# Patient Record
Sex: MALE | Race: OTHER | NOT HISPANIC OR LATINO | URBAN - METROPOLITAN AREA
[De-identification: names, ages, dates, MRNs, and addresses within clinical notes are randomized per-mention and may not be internally consistent; named-entity substitution may affect disease eponyms.]

---

## 2019-09-08 ENCOUNTER — EMERGENCY (EMERGENCY)
Facility: HOSPITAL | Age: 24
LOS: 1 days | Discharge: HOME | End: 2019-09-08
Attending: EMERGENCY MEDICINE
Payer: COMMERCIAL

## 2019-09-08 VITALS
RESPIRATION RATE: 16 BRPM | TEMPERATURE: 99 F | DIASTOLIC BLOOD PRESSURE: 80 MMHG | SYSTOLIC BLOOD PRESSURE: 113 MMHG | HEART RATE: 56 BPM | OXYGEN SATURATION: 100 %

## 2019-09-08 VITALS — WEIGHT: 125 LBS | HEIGHT: 68 IN

## 2019-09-08 LAB
AMPHET UR-MCNC: NEGATIVE — SIGNIFICANT CHANGE UP
BARBITURATES UR SCN-MCNC: NEGATIVE — SIGNIFICANT CHANGE UP
BENZODIAZ UR-MCNC: NEGATIVE — SIGNIFICANT CHANGE UP
COCAINE METAB.OTHER UR-MCNC: NEGATIVE — SIGNIFICANT CHANGE UP
ETHANOL SERPL-MCNC: <10 MG/DL — SIGNIFICANT CHANGE UP
HCT VFR BLD CALC: 51.7 % — HIGH (ref 39–50)
HGB BLD-MCNC: 16.2 G/DL — SIGNIFICANT CHANGE UP (ref 13–17)
MCHC RBC-ENTMCNC: 26.8 PG — LOW (ref 27–34)
MCHC RBC-ENTMCNC: 31.3 GM/DL — LOW (ref 32–36)
MCV RBC AUTO: 85.6 FL — SIGNIFICANT CHANGE UP (ref 80–100)
METHADONE UR-MCNC: NEGATIVE — SIGNIFICANT CHANGE UP
OPIATES UR-MCNC: NEGATIVE — SIGNIFICANT CHANGE UP
PCP SPEC-MCNC: SIGNIFICANT CHANGE UP
PCP UR-MCNC: NEGATIVE — SIGNIFICANT CHANGE UP
PLATELET # BLD AUTO: 247 K/UL — SIGNIFICANT CHANGE UP (ref 150–400)
RBC # BLD: 6.04 M/UL — HIGH (ref 4.2–5.8)
RBC # FLD: 12.5 % — SIGNIFICANT CHANGE UP (ref 10.3–14.5)
THC UR QL: POSITIVE
WBC # BLD: 7.12 K/UL — SIGNIFICANT CHANGE UP (ref 3.8–10.5)
WBC # FLD AUTO: 7.12 K/UL — SIGNIFICANT CHANGE UP (ref 3.8–10.5)

## 2019-09-08 PROCEDURE — 82962 GLUCOSE BLOOD TEST: CPT

## 2019-09-08 PROCEDURE — 80053 COMPREHEN METABOLIC PANEL: CPT

## 2019-09-08 PROCEDURE — 99284 EMERGENCY DEPT VISIT MOD MDM: CPT

## 2019-09-08 PROCEDURE — 99284 EMERGENCY DEPT VISIT MOD MDM: CPT | Mod: 25

## 2019-09-08 PROCEDURE — 36415 COLL VENOUS BLD VENIPUNCTURE: CPT

## 2019-09-08 PROCEDURE — 93005 ELECTROCARDIOGRAM TRACING: CPT

## 2019-09-08 PROCEDURE — 70450 CT HEAD/BRAIN W/O DYE: CPT

## 2019-09-08 PROCEDURE — 82009 KETONE BODYS QUAL: CPT

## 2019-09-08 PROCEDURE — 93010 ELECTROCARDIOGRAM REPORT: CPT

## 2019-09-08 PROCEDURE — 85027 COMPLETE CBC AUTOMATED: CPT

## 2019-09-08 PROCEDURE — 70450 CT HEAD/BRAIN W/O DYE: CPT | Mod: 26

## 2019-09-08 PROCEDURE — 80307 DRUG TEST PRSMV CHEM ANLYZR: CPT

## 2019-09-08 NOTE — ED PROVIDER NOTE - PATIENT PORTAL LINK FT
You can access the FollowMyHealth Patient Portal offered by [facility] by registering at the following website: [email or web address]. By joining [employer]’s FollowMyHealth portal, you will also be able to view your health information using other applications (apps) compatible with our system.

## 2019-09-08 NOTE — ED STATDOCS - OBJECTIVE STATEMENT
pt smoking e cigarette. Sudden onset unable to sit still at three am Thrashing back and forth in bed. unable to sleep. Ran into mother's room, repetitive speech        saying that he wanted her to listen to him but he could not say what was going on. Felt like he had to pace. Mother gave tylenol pm to help with c/o headache and insomnia, and he went to sleep. Pt also type 1 diabetic . This morning mother let him sleep and around 12 noon she went to get him up sso he can eat because he needed to get his insulin. He started throwing himself around his room, not making eye contact, ran around house. Mother and brother held him down and brought him.  No fhx or pt hx of psychiatric illness. Smoking via juul.   Need medical eval and psych eval ? toxic effect of juul product?   to main room for eval by main provider

## 2019-09-08 NOTE — ED STATDOCS - PROGRESS NOTE DETAILS
juuling then abnormal behavior see [facility]  toxic effect versus psychiatric illness  medical eval ,  eval dispo

## 2019-09-08 NOTE — ED ADULT NURSE REASSESSMENT NOTE - NS ED NURSE REASSESS COMMENT FT1
Serum glucose noted to be 55.  family brought patient angel tubbs which he tolerated well.  Remains Alert and oriented X 3, calm, and cooperative. Family at bedside.

## 2019-09-08 NOTE — ED ADULT TRIAGE NOTE - CHIEF COMPLAINT QUOTE
c/o as per mother pt is anxious,, sudden onset of was sleeping and then was running around the house, has been using e cigarette and mother thinks remi from that

## 2019-09-08 NOTE — ED ADULT NURSE NOTE - OBJECTIVE STATEMENT
Received pt in yellow gown and assumed pt care at 1612. Pt reports episodes of shaking his head violently, running around the house at 0300 this morning.  Took tylenol PM and woke up at 1200 this afternoon and "I started running around like crazy again."  pt reports he does not remember all of the events that transpired.  Pt used marijuana yesterday at 2pm.  He is alert and oriented X 3 and denies discomfort. he denies depression, denies suicidal thoughts, denies homicidal thoughts. Received pt in yellow gown and assumed pt care at 1612. Pt reports episodes of shaking his head violently, running around the house at 0300 this morning.  Took tylenol PM and woke up at 1200 this afternoon and "I started running around like crazy again."  pt reports he does not remember all of the events that transpired.  Pt used marijuana yesterday at 2pm.  He is alert and oriented X 3 and denies discomfort. he denies depression, denies suicidal thoughts, denies homicidal thoughts.  Mother states pt ran into her room at 300am waving his hands and stating he had a headache and something was wrong with him.  At 1200 in the afternoon mother witnessed pt with "wild eyes" and saying things that didn't make sense.  throwing water on him didn't affect him.  It took a few minutes for him to "wake up."

## 2019-09-08 NOTE — ED PROVIDER NOTE - OBJECTIVE STATEMENT
This patient is a 23 year old young man hx of insulin dependent diabetes with no known psych history who presents to the ER with his mother who reports that patient having altered mental status episode that has now resolved.  Patient was reported to appear anxious and was running around the house and not making sense when speaking.  Patient states that the incident happened after "vaping."  Mother states that patient has had confusion episodes in the past when he was hypoglycemic but today his symptoms did not improve after he was given juice.  His mother is concerned because of the recent reports of illness after vaping.

## 2019-09-08 NOTE — ED ADULT NURSE NOTE - NSIMPLEMENTINTERV_GEN_ALL_ED
Implemented All Universal Safety Interventions:  [place] to call system. Call bell, personal items and telephone within reach. Instruct patient to call for assistance. Room bathroom lighting operational. Non-slip footwear when patient is off stretcher. Physically safe environment: no spills, clutter or unnecessary equipment. Stretcher in lowest position, wheels locked, appropriate side rails in place.

## 2019-09-08 NOTE — ED PROVIDER NOTE - CLINICAL SUMMARY MEDICAL DECISION MAKING FREE TEXT BOX
23 year old hx of DM with reports of confusion and anxiety patient admits to marijuana use and vaping.  CT negative for space occupying lesion.  Patient hypoglycemic given food and juice; neurologically intact in the ER at this time normal behavior.  Patient advised to stop vaping and using drugs; monitor blood sugar levels and to follow-up with PMD.

## 2020-05-04 ENCOUNTER — TRANSCRIPTION ENCOUNTER (OUTPATIENT)
Age: 25
End: 2020-05-04

## 2022-01-26 PROBLEM — E13.9 OTHER SPECIFIED DIABETES MELLITUS WITHOUT COMPLICATIONS: Chronic | Status: ACTIVE | Noted: 2019-09-08

## 2022-05-09 PROBLEM — Z00.00 ENCOUNTER FOR PREVENTIVE HEALTH EXAMINATION: Status: ACTIVE | Noted: 2022-05-09

## 2022-05-11 ENCOUNTER — APPOINTMENT (OUTPATIENT)
Dept: ENDOCRINOLOGY | Facility: CLINIC | Age: 27
End: 2022-05-11
Payer: COMMERCIAL

## 2022-05-11 ENCOUNTER — RESULT CHARGE (OUTPATIENT)
Age: 27
End: 2022-05-11

## 2022-05-11 VITALS
SYSTOLIC BLOOD PRESSURE: 110 MMHG | HEIGHT: 68 IN | HEART RATE: 89 BPM | WEIGHT: 122 LBS | BODY MASS INDEX: 18.49 KG/M2 | DIASTOLIC BLOOD PRESSURE: 70 MMHG

## 2022-05-11 LAB
GLUCOSE BLDC GLUCOMTR-MCNC: 125
GLUCOSE BLDC GLUCOMTR-MCNC: 63

## 2022-05-11 PROCEDURE — 99205 OFFICE O/P NEW HI 60 MIN: CPT | Mod: 25

## 2022-05-11 PROCEDURE — 99417 PROLNG OP E/M EACH 15 MIN: CPT

## 2022-05-11 PROCEDURE — 82962 GLUCOSE BLOOD TEST: CPT

## 2022-05-11 RX ORDER — GLUCAGON INJECTION, SOLUTION 1 MG/.2ML
1 INJECTION, SOLUTION SUBCUTANEOUS
Qty: 3 | Refills: 3 | Status: ACTIVE | COMMUNITY
Start: 2022-05-11 | End: 1900-01-01

## 2022-06-10 ENCOUNTER — APPOINTMENT (OUTPATIENT)
Dept: ENDOCRINOLOGY | Facility: CLINIC | Age: 27
End: 2022-06-10
Payer: COMMERCIAL

## 2022-06-10 PROCEDURE — G0108 DIAB MANAGE TRN  PER INDIV: CPT

## 2022-06-11 ENCOUNTER — NON-APPOINTMENT (OUTPATIENT)
Age: 27
End: 2022-06-11

## 2022-06-12 ENCOUNTER — NON-APPOINTMENT (OUTPATIENT)
Age: 27
End: 2022-06-12

## 2022-06-13 ENCOUNTER — NON-APPOINTMENT (OUTPATIENT)
Age: 27
End: 2022-06-13

## 2022-06-14 ENCOUNTER — NON-APPOINTMENT (OUTPATIENT)
Age: 27
End: 2022-06-14

## 2022-07-01 LAB
HBA1C MFR BLD HPLC: 9.4
LDLC SERPL DIRECT ASSAY-MCNC: 89
MICROALBUMIN/CREAT 24H UR-RTO: 3

## 2022-07-06 ENCOUNTER — APPOINTMENT (OUTPATIENT)
Dept: ENDOCRINOLOGY | Facility: CLINIC | Age: 27
End: 2022-07-06
Payer: COMMERCIAL

## 2022-07-06 VITALS
BODY MASS INDEX: 18.19 KG/M2 | SYSTOLIC BLOOD PRESSURE: 118 MMHG | HEART RATE: 78 BPM | WEIGHT: 120 LBS | HEIGHT: 68 IN | DIASTOLIC BLOOD PRESSURE: 78 MMHG

## 2022-07-06 DIAGNOSIS — Z78.9 OTHER SPECIFIED HEALTH STATUS: ICD-10-CM

## 2022-07-06 LAB
GLUCOSE BLDC GLUCOMTR-MCNC: 340
HBA1C MFR BLD HPLC: 9.3

## 2022-07-06 PROCEDURE — 83036 HEMOGLOBIN GLYCOSYLATED A1C: CPT | Mod: QW

## 2022-07-06 PROCEDURE — 82962 GLUCOSE BLOOD TEST: CPT

## 2022-07-06 PROCEDURE — 99214 OFFICE O/P EST MOD 30 MIN: CPT | Mod: 25

## 2022-07-06 RX ORDER — LANCETS 33 GAUGE
EACH MISCELLANEOUS
Qty: 200 | Refills: 0 | Status: ACTIVE | COMMUNITY
Start: 2022-06-08

## 2022-07-06 NOTE — ASSESSMENT
[FreeTextEntry1] : [employer] is supplier for DEXCOM- need order sent [phone].\par \par T1DM\par -Long discussion had with patient regarding his diabetes, he is ready to use tools like sensor and pump to help achieve better DM control.\par -For now, blood sugars not at goal. Increase Toujeo to 18 units daily (did not do this after visit with CDE).\par -Tighten carb ratio to 1:9, keep ISF at 1:50 over 140\par -Will order DEXCOM today, will need teach apt\par -Pt interested in Omnipod, needs some more carb counting education prior to starting that but can faciliate order in meantime\par -Continue to watch diet and increase exercise as tolerated, goal of 30 mins a day 5x a week\par -Continue to follow up with all specialists including ophtho, podiatry\par \par RTO 8/2022 with MD

## 2022-07-06 NOTE — REVIEW OF SYSTEMS
[Fatigue] : no fatigue [Recent Weight Gain (___ Lbs)] : no recent weight gain [Recent Weight Loss (___ Lbs)] : no recent weight loss [Dysphagia] : no dysphagia [Neck Pain] : no neck pain [Dysphonia] : no dysphonia [Chest Pain] : no chest pain [Shortness Of Breath] : no shortness of breath [Constipation] : no constipation [Diarrhea] : no diarrhea [Polyuria] : no polyuria [Polydipsia] : no polydipsia

## 2022-07-06 NOTE — HISTORY OF PRESENT ILLNESS
[FreeTextEntry1] : Pt could not tolerate alexis, gave him rash.\par Needs this chart note for DEXCOM to be delivered.\par Pt interested in Omnipod. \par \par type: 1\par diabetes since: 12 yo, had a virus that attacked pancreas, symptoms of polyuria \par number of years on insulin: at diagnosis\par current severity: uncontrolled\par FH of diabetes: none\par complications: none\par \par SMBG\par Checks BS 4-5x a day\par On average 200s\par Had an episode of BS of 80 on monday \par \par Glucose Sensor Necessity:  This patient with diabetes performs 4 or more glucose checks per day utilizing a home blood glucose monitor.  The patient is treated with insulin via 3 or more injections daily. This patient requires frequent adjustments to their insulin treatment on the basis of therapeutic continuous glucose monitoring results.  In addition, the patient has been to our office for an evaluation of their diabetes control within the past 6 months.  \par  \par Current drug regimen\par 16 u Toujeo daily (never went up to 18 units at  last visit)\par  Fiasp ICR 1:10 with ISF 1:50 above 140.\par \par No updated HGA1C on labs \par HGA1C PCOT- 9.3\par \par Eye exam: last eye exam early 2022- denies DR \par Foot exam: recently treated for fungal infection\par Kidney disease: denies\par Heart disease: denies \par \par Weight: stable\par Diet: eats 2-3 meals a day, doesn’t always bolus for snacks \par Exercise: likes to ride bike \par Smoking : denies

## 2022-07-18 RX ORDER — BLOOD-GLUCOSE TRANSMITTER
EACH MISCELLANEOUS
Qty: 1 | Refills: 1 | Status: ACTIVE | COMMUNITY
Start: 2022-07-18 | End: 1900-01-01

## 2022-07-18 RX ORDER — BLOOD-GLUCOSE,RECEIVER,CONT
EACH MISCELLANEOUS
Qty: 1 | Refills: 0 | Status: ACTIVE | COMMUNITY
Start: 2022-07-18 | End: 1900-01-01

## 2022-07-18 RX ORDER — BLOOD-GLUCOSE SENSOR
EACH MISCELLANEOUS
Qty: 3 | Refills: 1 | Status: ACTIVE | COMMUNITY
Start: 2022-07-18 | End: 1900-01-01

## 2022-08-08 ENCOUNTER — APPOINTMENT (OUTPATIENT)
Dept: ENDOCRINOLOGY | Facility: CLINIC | Age: 27
End: 2022-08-08
Payer: COMMERCIAL

## 2022-08-08 PROCEDURE — G0108 DIAB MANAGE TRN  PER INDIV: CPT

## 2022-08-29 ENCOUNTER — APPOINTMENT (OUTPATIENT)
Dept: ENDOCRINOLOGY | Facility: CLINIC | Age: 27
End: 2022-08-29
Payer: COMMERCIAL

## 2022-08-29 PROCEDURE — G0108 DIAB MANAGE TRN  PER INDIV: CPT

## 2022-08-31 ENCOUNTER — APPOINTMENT (OUTPATIENT)
Dept: ENDOCRINOLOGY | Facility: CLINIC | Age: 27
End: 2022-08-31

## 2022-08-31 VITALS
SYSTOLIC BLOOD PRESSURE: 118 MMHG | HEART RATE: 101 BPM | BODY MASS INDEX: 17.73 KG/M2 | WEIGHT: 117 LBS | HEIGHT: 68 IN | DIASTOLIC BLOOD PRESSURE: 72 MMHG

## 2022-08-31 LAB — GLUCOSE BLDC GLUCOMTR-MCNC: 261

## 2022-08-31 PROCEDURE — 82962 GLUCOSE BLOOD TEST: CPT

## 2022-08-31 PROCEDURE — 99215 OFFICE O/P EST HI 40 MIN: CPT | Mod: 25

## 2022-08-31 PROCEDURE — 95251 CONT GLUC MNTR ANALYSIS I&R: CPT

## 2022-08-31 RX ORDER — FLASH GLUCOSE SENSOR
KIT MISCELLANEOUS
Qty: 6 | Refills: 1 | Status: DISCONTINUED | COMMUNITY
Start: 2022-05-11 | End: 2022-08-31

## 2022-08-31 RX ORDER — INSULIN GLARGINE 300 U/ML
300 INJECTION, SOLUTION SUBCUTANEOUS
Qty: 3 | Refills: 1 | Status: ACTIVE | COMMUNITY
Start: 2022-08-31

## 2022-09-12 ENCOUNTER — APPOINTMENT (OUTPATIENT)
Dept: ENDOCRINOLOGY | Facility: CLINIC | Age: 27
End: 2022-09-12
Payer: COMMERCIAL

## 2022-09-12 PROCEDURE — G0108 DIAB MANAGE TRN  PER INDIV: CPT

## 2022-09-25 ENCOUNTER — NON-APPOINTMENT (OUTPATIENT)
Age: 27
End: 2022-09-25

## 2022-09-26 ENCOUNTER — APPOINTMENT (OUTPATIENT)
Dept: ENDOCRINOLOGY | Facility: CLINIC | Age: 27
End: 2022-09-26
Payer: COMMERCIAL

## 2022-09-26 ENCOUNTER — NON-APPOINTMENT (OUTPATIENT)
Age: 27
End: 2022-09-26

## 2022-09-26 PROCEDURE — G0108 DIAB MANAGE TRN  PER INDIV: CPT

## 2022-09-27 ENCOUNTER — NON-APPOINTMENT (OUTPATIENT)
Age: 27
End: 2022-09-27

## 2022-09-28 ENCOUNTER — NON-APPOINTMENT (OUTPATIENT)
Age: 27
End: 2022-09-28

## 2022-09-30 ENCOUNTER — NON-APPOINTMENT (OUTPATIENT)
Age: 27
End: 2022-09-30

## 2022-10-03 ENCOUNTER — APPOINTMENT (OUTPATIENT)
Dept: ENDOCRINOLOGY | Facility: CLINIC | Age: 27
End: 2022-10-03

## 2022-10-10 ENCOUNTER — APPOINTMENT (OUTPATIENT)
Dept: ENDOCRINOLOGY | Facility: CLINIC | Age: 27
End: 2022-10-10

## 2022-10-10 PROCEDURE — P0004: CPT

## 2022-11-07 ENCOUNTER — APPOINTMENT (OUTPATIENT)
Dept: ENDOCRINOLOGY | Facility: CLINIC | Age: 27
End: 2022-11-07

## 2022-11-07 ENCOUNTER — NON-APPOINTMENT (OUTPATIENT)
Age: 27
End: 2022-11-07

## 2022-11-07 PROCEDURE — G0108 DIAB MANAGE TRN  PER INDIV: CPT

## 2022-11-14 ENCOUNTER — APPOINTMENT (OUTPATIENT)
Dept: ENDOCRINOLOGY | Facility: CLINIC | Age: 27
End: 2022-11-14

## 2022-11-16 RX ORDER — INSULIN ASPART INJECTION 100 [IU]/ML
100 INJECTION, SOLUTION SUBCUTANEOUS
Qty: 7 | Refills: 1 | Status: ACTIVE | COMMUNITY
Start: 2022-11-16 | End: 1900-01-01

## 2022-11-18 ENCOUNTER — APPOINTMENT (OUTPATIENT)
Dept: ENDOCRINOLOGY | Facility: CLINIC | Age: 27
End: 2022-11-18

## 2022-11-18 PROCEDURE — ZZZZZ: CPT

## 2022-12-01 ENCOUNTER — APPOINTMENT (OUTPATIENT)
Dept: ENDOCRINOLOGY | Facility: CLINIC | Age: 27
End: 2022-12-01

## 2022-12-03 ENCOUNTER — NON-APPOINTMENT (OUTPATIENT)
Age: 27
End: 2022-12-03

## 2022-12-05 ENCOUNTER — APPOINTMENT (OUTPATIENT)
Dept: ENDOCRINOLOGY | Facility: CLINIC | Age: 27
End: 2022-12-05
Payer: COMMERCIAL

## 2022-12-05 PROCEDURE — G0108 DIAB MANAGE TRN  PER INDIV: CPT

## 2022-12-12 ENCOUNTER — APPOINTMENT (OUTPATIENT)
Dept: ENDOCRINOLOGY | Facility: CLINIC | Age: 27
End: 2022-12-12
Payer: COMMERCIAL

## 2022-12-12 PROCEDURE — G0108 DIAB MANAGE TRN  PER INDIV: CPT

## 2023-01-03 ENCOUNTER — APPOINTMENT (OUTPATIENT)
Dept: ENDOCRINOLOGY | Facility: CLINIC | Age: 28
End: 2023-01-03
Payer: COMMERCIAL

## 2023-01-03 VITALS — SYSTOLIC BLOOD PRESSURE: 122 MMHG | HEART RATE: 83 BPM | DIASTOLIC BLOOD PRESSURE: 80 MMHG | OXYGEN SATURATION: 99 %

## 2023-01-03 VITALS — WEIGHT: 120 LBS | BODY MASS INDEX: 18.19 KG/M2 | HEIGHT: 68 IN

## 2023-01-03 PROCEDURE — 95251 CONT GLUC MNTR ANALYSIS I&R: CPT

## 2023-01-03 PROCEDURE — 99214 OFFICE O/P EST MOD 30 MIN: CPT | Mod: 25

## 2023-01-03 RX ORDER — INSULIN PMP CART,AUT,G6/7,CNTR
EACH SUBCUTANEOUS
Qty: 30 | Refills: 0 | Status: ACTIVE | COMMUNITY
Start: 2022-12-08

## 2023-01-03 RX ORDER — INSULIN PMP CART,AUT,G6/7,CNTR
EACH SUBCUTANEOUS
Qty: 1 | Refills: 0 | Status: ACTIVE | COMMUNITY
Start: 2022-08-15

## 2023-01-03 RX ORDER — CEPHALEXIN 500 MG/1
500 CAPSULE ORAL
Qty: 21 | Refills: 0 | Status: COMPLETED | COMMUNITY
Start: 2022-08-07

## 2023-01-03 RX ORDER — NIRMATRELVIR AND RITONAVIR 300-100 MG
20 X 150 MG & KIT ORAL
Qty: 30 | Refills: 0 | Status: COMPLETED | COMMUNITY
Start: 2022-12-23

## 2023-01-03 RX ORDER — AMOXICILLIN AND CLAVULANATE POTASSIUM 875; 125 MG/1; MG/1
875-125 TABLET, COATED ORAL
Qty: 20 | Refills: 0 | Status: COMPLETED | COMMUNITY
Start: 2022-11-02

## 2023-01-03 NOTE — ASSESSMENT
[FreeTextEntry1] : T1DM\par -Long discussion had with patient regarding his diabetes, doing much better since having the tools of a sensor and insulin pump. \par -For now, blood sugars not at goal specifically post 4 pm and often over night due to late night eating.\par -Tighten carb ratio to 1:9.5 from 4p-3a\par -Continue DEXCOM use\par -Continue to watch diet and increase exercise as tolerated, goal of 30 mins a day 5x a week\par -Continue to follow up with all specialists including ophtho, podiatry\par \par Need updated fasting labs\par \par RTO 4/2023 with MD

## 2023-01-03 NOTE — HISTORY OF PRESENT ILLNESS
[FreeTextEntry1] : Now on Omnipod since 11/2022. Doing well. \par S/P Paxlovid for COVID- late Dec.\par \par type: 1\par diabetes since: 14 yo, had a virus that attacked pancreas, symptoms of polyuria \par number of years on insulin: at diagnosis\par current severity: uncontrolled\par FH of diabetes: none\par complications: none\par \par SMBG\par DEXCOM\par Average glucose 192\par Standard deviation 65\par GMI 7.9% \par \par 17% Very High\par 32% High \par 49% In range\par 1% Low\par <1% Very low \par \par Interpretation: Doesnt sleep traditional hours\par Eats as late as 2am\par \par Glucose Sensor Necessity:  This patient with diabetes performs 4 or more glucose checks per day utilizing a home blood glucose monitor.  The patient is treated with insulin via 3 or more injections daily. This patient requires frequent adjustments to their insulin treatment on the basis of therapeutic continuous glucose monitoring results.  In addition, the patient has been to our office for an evaluation of their diabetes control within the past 6 months.  \par  \par Current drug regimen\par FIASP in OMNIPOD \par Pump settings scanned in \par \par HGA1C 9/2022 (PRIOR TO OMINPOD) 9.0\par \par Eye exam: last eye exam early 2022- denies DR \par Foot exam: recently treated for fungal infection\par Kidney disease: denies\par Heart disease: denies \par \par Weight: stable\par Diet: eats 2-3 meals a day, doesn’t always bolus for snacks \par Exercise: likes to ride bike \par Smoking : denies

## 2023-01-23 ENCOUNTER — OFFICE (OUTPATIENT)
Dept: URBAN - METROPOLITAN AREA CLINIC 63 | Facility: CLINIC | Age: 28
Setting detail: OPHTHALMOLOGY
End: 2023-01-23
Payer: COMMERCIAL

## 2023-01-23 DIAGNOSIS — E11.9: ICD-10-CM

## 2023-01-23 DIAGNOSIS — H16.223: ICD-10-CM

## 2023-01-23 PROCEDURE — 92014 COMPRE OPH EXAM EST PT 1/>: CPT | Performed by: STUDENT IN AN ORGANIZED HEALTH CARE EDUCATION/TRAINING PROGRAM

## 2023-01-23 ASSESSMENT — REFRACTION_CURRENTRX
OS_VPRISM_DIRECTION: SV
OD_SPHERE: -1.25
OS_OVR_VA: 20/
OD_OVR_VA: 20/
OS_AXIS: 050
OS_SPHERE: -1.25
OD_AXIS: 106
OD_VPRISM_DIRECTION: SV
OD_CYLINDER: -0.75
OS_CYLINDER: -0.75

## 2023-01-23 ASSESSMENT — REFRACTION_AUTOREFRACTION
OD_AXIS: 105
OD_SPHERE: -0.75
OS_AXIS: 056
OS_SPHERE: -1.00
OD_CYLINDER: -1.25
OS_CYLINDER: -1.00

## 2023-01-23 ASSESSMENT — LID EXAM ASSESSMENTS
OS_MEIBOMITIS: LLL 1+
OD_MEIBOMITIS: RLL 1+

## 2023-01-23 ASSESSMENT — VISUAL ACUITY
OD_BCVA: 20/20
OS_BCVA: 20/20

## 2023-01-23 ASSESSMENT — KERATOMETRY
OD_K1POWER_DIOPTERS: 43.50
OD_AXISANGLE_DEGREES: 079
OS_K2POWER_DIOPTERS: 43.75
OS_AXISANGLE_DEGREES: 150
OS_K1POWER_DIOPTERS: 42.75
OD_K2POWER_DIOPTERS: 45.00

## 2023-01-23 ASSESSMENT — SUPERFICIAL PUNCTATE KERATITIS (SPK)
OD_SPK: 1+
OS_SPK: 1+

## 2023-01-23 ASSESSMENT — SPHEQUIV_DERIVED
OD_SPHEQUIV: -1.375
OS_SPHEQUIV: -1.5

## 2023-01-23 ASSESSMENT — AXIALLENGTH_DERIVED
OS_AL: 24.2881
OD_AL: 23.855

## 2023-01-23 ASSESSMENT — TONOMETRY
OD_IOP_MMHG: 20
OS_IOP_MMHG: 18

## 2023-01-23 ASSESSMENT — CONFRONTATIONAL VISUAL FIELD TEST (CVF)
OD_FINDINGS: FULL
OS_FINDINGS: FULL

## 2023-04-24 LAB — HBA1C MFR BLD HPLC: 9

## 2023-04-25 ENCOUNTER — RESULT CHARGE (OUTPATIENT)
Age: 28
End: 2023-04-25

## 2023-04-25 ENCOUNTER — APPOINTMENT (OUTPATIENT)
Dept: ENDOCRINOLOGY | Facility: CLINIC | Age: 28
End: 2023-04-25
Payer: COMMERCIAL

## 2023-04-25 VITALS
DIASTOLIC BLOOD PRESSURE: 74 MMHG | HEART RATE: 85 BPM | WEIGHT: 128 LBS | OXYGEN SATURATION: 98 % | BODY MASS INDEX: 19.4 KG/M2 | SYSTOLIC BLOOD PRESSURE: 128 MMHG | HEIGHT: 68 IN

## 2023-04-25 LAB — GLUCOSE BLDC GLUCOMTR-MCNC: 256

## 2023-04-25 PROCEDURE — 99215 OFFICE O/P EST HI 40 MIN: CPT | Mod: 25

## 2023-04-25 PROCEDURE — 82962 GLUCOSE BLOOD TEST: CPT

## 2023-04-25 PROCEDURE — 95251 CONT GLUC MNTR ANALYSIS I&R: CPT

## 2023-06-30 ENCOUNTER — APPOINTMENT (OUTPATIENT)
Dept: ENDOCRINOLOGY | Facility: CLINIC | Age: 28
End: 2023-06-30
Payer: COMMERCIAL

## 2023-06-30 VITALS
HEIGHT: 68 IN | HEART RATE: 85 BPM | SYSTOLIC BLOOD PRESSURE: 122 MMHG | BODY MASS INDEX: 18.79 KG/M2 | OXYGEN SATURATION: 99 % | DIASTOLIC BLOOD PRESSURE: 84 MMHG | WEIGHT: 124 LBS

## 2023-06-30 LAB — GLUCOSE BLDC GLUCOMTR-MCNC: 166

## 2023-06-30 PROCEDURE — 99214 OFFICE O/P EST MOD 30 MIN: CPT | Mod: 25

## 2023-06-30 PROCEDURE — 95251 CONT GLUC MNTR ANALYSIS I&R: CPT

## 2023-06-30 PROCEDURE — 82962 GLUCOSE BLOOD TEST: CPT

## 2023-06-30 RX ORDER — INSULIN ASPART INJECTION 100 [IU]/ML
100 INJECTION, SOLUTION SUBCUTANEOUS
Qty: 5 | Refills: 0 | Status: DISCONTINUED | COMMUNITY
Start: 2022-08-31 | End: 2023-06-30

## 2023-06-30 RX ORDER — GLUCAGON INJECTION, SOLUTION 1 MG/.2ML
1 INJECTION, SOLUTION SUBCUTANEOUS
Qty: 1 | Refills: 1 | Status: DISCONTINUED | COMMUNITY
Start: 2022-12-12 | End: 2023-06-30

## 2023-06-30 RX ORDER — GLUCAGON INJECTION, SOLUTION 1 MG/.2ML
1 INJECTION, SOLUTION SUBCUTANEOUS
Qty: 1 | Refills: 1 | Status: DISCONTINUED | COMMUNITY
Start: 2022-12-05 | End: 2023-06-30

## 2023-06-30 RX ORDER — GLUCAGON INJECTION, SOLUTION 1 MG/.2ML
1 INJECTION, SOLUTION SUBCUTANEOUS
Qty: 1 | Refills: 1 | Status: DISCONTINUED | COMMUNITY
Start: 2022-09-12 | End: 2023-06-30

## 2023-06-30 NOTE — ASSESSMENT
[FreeTextEntry1] : Labs done yesterday, only partial resulted\par \par T1DM\par -Long discussion had with patient regarding his diabetes, doing much better since having the tools of a sensor and insulin pump. \par -For now, blood sugars not at goal specifically post 4 pm and often over night due to late night eating.\par -Tighten carb ratio to 1:8.5 for all hours \par -Continue DEXCOM use\par -Continue to watch diet and increase exercise as tolerated, goal of 30 mins a day 5x a week\par -Continue to follow up with all specialists including ophtho, podiatry\par \par LDL cholesterol not at goal- pt admits he can do better with diet choices \par \par RTO 8/2023 with MD

## 2023-06-30 NOTE — HISTORY OF PRESENT ILLNESS
[FreeTextEntry1] : Now on Omnipod since 11/2022. Doing well. \par \par type: 1\par diabetes since: 14 yo, had a virus that attacked pancreas, symptoms of polyuria \par number of years on insulin: at diagnosis\par current severity: uncontrolled\par FH of diabetes: none\par complications: none\par \par SMBG\par DEXCOM\par Average glucose 176\par Standard deviation 69\par GMI 7.5% \par \par 14% Very High\par 23% High \par 62% In range\par 1% Low\par <1% Very low \par \par Interpretation: Doesnt sleep traditional hours\par Eats as late as 4am\par \par Glucose Sensor Necessity:  This patient with diabetes performs 4 or more glucose checks per day utilizing a home blood glucose monitor.  The patient is treated with insulin via 3 or more injections daily. This patient requires frequent adjustments to their insulin treatment on the basis of therapeutic continuous glucose monitoring results.  In addition, the patient has been to our office for an evaluation of their diabetes control within the past 6 months.  \par \par FS in office 166- fasting \par Current drug regimen\par FIASP in OMNIPOD \par Pump settings scanned in \par \par Need updated HGA1C\par \par Eye exam: Jan 2023- denies DR \par Foot exam: recently treated for fungal infection- now no issues \par Kidney disease: denies\par Heart disease: denies \par \par Weight: stable\par Diet: eats 2-3 meals a day, doesn’t always bolus for snacks \par Exercise: likes to ride bike \par Smoking : denies

## 2023-09-15 ENCOUNTER — APPOINTMENT (OUTPATIENT)
Dept: ENDOCRINOLOGY | Facility: CLINIC | Age: 28
End: 2023-09-15
Payer: COMMERCIAL

## 2023-09-15 ENCOUNTER — RESULT CHARGE (OUTPATIENT)
Age: 28
End: 2023-09-15

## 2023-09-15 VITALS
SYSTOLIC BLOOD PRESSURE: 124 MMHG | DIASTOLIC BLOOD PRESSURE: 80 MMHG | HEART RATE: 98 BPM | OXYGEN SATURATION: 98 % | BODY MASS INDEX: 18.79 KG/M2 | WEIGHT: 124 LBS | HEIGHT: 68 IN

## 2023-09-15 LAB — GLUCOSE BLDC GLUCOMTR-MCNC: 152

## 2023-09-15 PROCEDURE — 99214 OFFICE O/P EST MOD 30 MIN: CPT | Mod: 25

## 2023-09-15 PROCEDURE — 82962 GLUCOSE BLOOD TEST: CPT

## 2023-09-15 PROCEDURE — 95251 CONT GLUC MNTR ANALYSIS I&R: CPT

## 2024-01-23 ENCOUNTER — OFFICE (OUTPATIENT)
Dept: URBAN - METROPOLITAN AREA CLINIC 63 | Facility: CLINIC | Age: 29
Setting detail: OPHTHALMOLOGY
End: 2024-01-23
Payer: COMMERCIAL

## 2024-01-23 ENCOUNTER — APPOINTMENT (OUTPATIENT)
Dept: ENDOCRINOLOGY | Facility: CLINIC | Age: 29
End: 2024-01-23
Payer: COMMERCIAL

## 2024-01-23 VITALS
HEIGHT: 68 IN | BODY MASS INDEX: 20.31 KG/M2 | SYSTOLIC BLOOD PRESSURE: 122 MMHG | HEART RATE: 75 BPM | DIASTOLIC BLOOD PRESSURE: 82 MMHG | WEIGHT: 134 LBS

## 2024-01-23 DIAGNOSIS — E11.9: ICD-10-CM

## 2024-01-23 DIAGNOSIS — H16.223: ICD-10-CM

## 2024-01-23 DIAGNOSIS — H52.13: ICD-10-CM

## 2024-01-23 LAB
GLUCOSE BLDC GLUCOMTR-MCNC: 111
HBA1C MFR BLD HPLC: 7.2
LDLC SERPL DIRECT ASSAY-MCNC: 117
TSH SERPL-ACNC: 0.95

## 2024-01-23 PROCEDURE — 92015 DETERMINE REFRACTIVE STATE: CPT | Performed by: STUDENT IN AN ORGANIZED HEALTH CARE EDUCATION/TRAINING PROGRAM

## 2024-01-23 PROCEDURE — 95251 CONT GLUC MNTR ANALYSIS I&R: CPT

## 2024-01-23 PROCEDURE — 92014 COMPRE OPH EXAM EST PT 1/>: CPT | Performed by: STUDENT IN AN ORGANIZED HEALTH CARE EDUCATION/TRAINING PROGRAM

## 2024-01-23 PROCEDURE — 82962 GLUCOSE BLOOD TEST: CPT

## 2024-01-23 PROCEDURE — G2211 COMPLEX E/M VISIT ADD ON: CPT

## 2024-01-23 PROCEDURE — 99215 OFFICE O/P EST HI 40 MIN: CPT | Mod: 25

## 2024-01-23 ASSESSMENT — REFRACTION_AUTOREFRACTION
OS_AXIS: 041
OS_CYLINDER: -1.00
OD_AXIS: 137
OD_CYLINDER: -0.75
OS_SPHERE: -1.50
OD_SPHERE: -1.50

## 2024-01-23 ASSESSMENT — REFRACTION_CURRENTRX
OS_AXIS: 050
OD_VPRISM_DIRECTION: SV
OS_SPHERE: -1.25
OD_CYLINDER: -0.75
OS_CYLINDER: -0.75
OD_OVR_VA: 20/
OD_AXIS: 106
OS_OVR_VA: 20/
OD_SPHERE: -1.25
OS_VPRISM_DIRECTION: SV

## 2024-01-23 ASSESSMENT — REFRACTION_MANIFEST
OS_VA1: 20/20
OS_AXIS: 040
OD_SPHERE: -1.50
OD_VA1: 20/20
OD_CYLINDER: -0.75
OU_VA: 20/20
OS_SPHERE: -1.50
OS_CYLINDER: -1.00
OD_AXIS: 135

## 2024-01-23 ASSESSMENT — SPHEQUIV_DERIVED
OS_SPHEQUIV: -2
OD_SPHEQUIV: -1.875
OS_SPHEQUIV: -2
OD_SPHEQUIV: -1.875

## 2024-01-23 ASSESSMENT — LID EXAM ASSESSMENTS
OS_MEIBOMITIS: LLL 1+
OD_MEIBOMITIS: RLL 1+

## 2024-01-23 ASSESSMENT — SUPERFICIAL PUNCTATE KERATITIS (SPK)
OS_SPK: 1+
OD_SPK: 1+

## 2024-01-23 ASSESSMENT — CONFRONTATIONAL VISUAL FIELD TEST (CVF)
OD_FINDINGS: FULL
OS_FINDINGS: FULL

## 2024-01-23 NOTE — HISTORY OF PRESENT ILLNESS
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

## 2024-01-23 NOTE — ASSESSMENT
[Long Term Vascular Complications] : long term vascular complications of diabetes [Action and use of Insulin] : action and use of short and long-acting insulin [Self Monitoring of Blood Glucose] : self monitoring of blood glucose [FreeTextEntry1] : 27M, thin w/ T1DM w/ hypers/hypos here for follow up rtc in 3months with np rtc in 6months with me due for labs  T1DM- on omnipod/G6. overall doing well. should get more for carbs bc still having some postprandial highs.  up to date with eye doctor Educated on diet. discussed complications of diabetes at length including MI/CVA/ESRD/dialysis/blindness/amputations/infections backups: toujeo and fiasp (needs to make sure he has some at home in case) has gvoke changed settings   ------ With regards to Dexcom:  Dx: E10.65 This patient with diabetes performs 4 glucose checks per day for at least the last 60 days utilizing a home blood glucose monitor. The patient is treated with insulin via 4 injections daily. This patient uses the CGM and glucose monitor readings to frequently adjust their insulin treatment based on set doses adjusted as needed by the physician. In addition, the patient has been to our office for an evaluation of their diabetes control within the past 6 months.

## 2024-04-11 LAB
HBA1C MFR BLD HPLC: 7.4
LDLC SERPL DIRECT ASSAY-MCNC: 117

## 2024-04-15 ENCOUNTER — APPOINTMENT (OUTPATIENT)
Dept: ENDOCRINOLOGY | Facility: CLINIC | Age: 29
End: 2024-04-15
Payer: COMMERCIAL

## 2024-04-15 ENCOUNTER — RESULT CHARGE (OUTPATIENT)
Age: 29
End: 2024-04-15

## 2024-04-15 VITALS
SYSTOLIC BLOOD PRESSURE: 132 MMHG | WEIGHT: 133 LBS | HEIGHT: 68 IN | HEART RATE: 88 BPM | OXYGEN SATURATION: 98 % | DIASTOLIC BLOOD PRESSURE: 84 MMHG | BODY MASS INDEX: 20.16 KG/M2

## 2024-04-15 DIAGNOSIS — E10.65 TYPE 1 DIABETES MELLITUS WITH HYPERGLYCEMIA: ICD-10-CM

## 2024-04-15 DIAGNOSIS — Z46.81 ENCOUNTER FOR FITTING AND ADJUSTMENT OF INSULIN PUMP: ICD-10-CM

## 2024-04-15 DIAGNOSIS — E10.649 TYPE 1 DIABETES MELLITUS WITH HYPOGLYCEMIA W/OUT COMA: ICD-10-CM

## 2024-04-15 DIAGNOSIS — Z96.41 PRESENCE OF INSULIN PUMP (EXTERNAL) (INTERNAL): ICD-10-CM

## 2024-04-15 DIAGNOSIS — E55.9 VITAMIN D DEFICIENCY, UNSPECIFIED: ICD-10-CM

## 2024-04-15 LAB — GLUCOSE BLDC GLUCOMTR-MCNC: 86

## 2024-04-15 PROCEDURE — 95251 CONT GLUC MNTR ANALYSIS I&R: CPT

## 2024-04-15 PROCEDURE — 82962 GLUCOSE BLOOD TEST: CPT

## 2024-04-15 PROCEDURE — G2211 COMPLEX E/M VISIT ADD ON: CPT

## 2024-04-15 PROCEDURE — 99214 OFFICE O/P EST MOD 30 MIN: CPT

## 2024-04-15 RX ORDER — CHOLECALCIFEROL (VITAMIN D3) 1250 MCG
1.25 MG CAPSULE ORAL
Qty: 12 | Refills: 1 | Status: ACTIVE | COMMUNITY
Start: 2024-04-15 | End: 1900-01-01

## 2024-04-15 NOTE — ASSESSMENT
[FreeTextEntry1] : 27 year old male with T1DM presents for follow-up. Glycemic control is above goal with A1c 7.4%.  1. T1DM- post prandial hyperglycemia after 12pm, seldomly in AM. -Intensify ICR at 12pm to 1:7. -Continue with AID Omnipod and Dexcom. -Up to date with eye exam. -No AMADO on most recent labs, will check with next labs. -Repeat A1c and RTO for follow-up in 3 months.  2. Hyperlipidemia- Chol 219, . -Low fat diet. -Repeat lipids in 3 months.  3. Vitamin D deficiency- vitamin D 15. -Start vitamin D 50,000 IU weekly. -Repeat level in 3 months. ------ CGM STATEMENT: This patient with diabetes:  -Is currently using CGM and is receiving insulin using an insulin pump. -Is adjusting insulin dose using ga correction factor programmed into the pump, as documented in the medical record. -Has been seen in the office by a provider within the last six months to review CGM data with their provider. -Has completed a diabetes education program. -Dexcom, Minor, and Guardian 4 CGM require one test strip daily to use in case of sensor failure or for blood glucose verification or during sensor warm up. -Guardian 3 CGM requires 6 test strips daily for calibration with automated pump and blood glucose correction. -CGM is medically necessary as it can integrate with their insulin pump to allow for closed loop therapy.

## 2024-04-15 NOTE — HISTORY OF PRESENT ILLNESS
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 Asymptomatic. Reviewed CGMS.  Avg  CV 38.5% Very high 14% High 23% Target 62% Low 1% Very low 0%  Interpretation: post prandial hyperglycemia after 12pm, rarely in the morning.  Total basal: 15units Basal 12a 0.55 12p 0.7  ICR 12a 8 12p 7.5  ISF 12a 50  Target 120  AIT 3

## 2024-04-15 NOTE — PHYSICAL EXAM
[Alert] : alert [Well Nourished] : well nourished [No Acute Distress] : no acute distress [Well Developed] : well developed [Normal Sclera/Conjunctiva] : normal sclera/conjunctiva [EOMI] : extra ocular movement intact [No Proptosis] : no proptosis [Thyroid Not Enlarged] : the thyroid was not enlarged [No Thyroid Nodules] : no palpable thyroid nodules [No Respiratory Distress] : no respiratory distress [No Accessory Muscle Use] : no accessory muscle use [Clear to Auscultation] : lungs were clear to auscultation bilaterally [Normal S1, S2] : normal S1 and S2 [Normal Rate] : heart rate was normal [Regular Rhythm] : with a regular rhythm [Normal Anterior Cervical Nodes] : no anterior cervical lymphadenopathy [Normal Posterior Cervical Nodes] : no posterior cervical lymphadenopathy [Oriented x3] : oriented to person, place, and time [Normal Affect] : the affect was normal [Normal Mood] : the mood was normal [Acanthosis Nigricans] : no acanthosis nigricans

## 2024-07-23 ENCOUNTER — APPOINTMENT (OUTPATIENT)
Dept: ENDOCRINOLOGY | Facility: CLINIC | Age: 29
End: 2024-07-23
Payer: COMMERCIAL

## 2024-07-23 VITALS
DIASTOLIC BLOOD PRESSURE: 70 MMHG | BODY MASS INDEX: 20.76 KG/M2 | HEIGHT: 68 IN | SYSTOLIC BLOOD PRESSURE: 120 MMHG | OXYGEN SATURATION: 98 % | WEIGHT: 137 LBS | HEART RATE: 74 BPM

## 2024-07-23 DIAGNOSIS — E10.65 TYPE 1 DIABETES MELLITUS WITH HYPERGLYCEMIA: ICD-10-CM

## 2024-07-23 DIAGNOSIS — Z46.81 ENCOUNTER FOR FITTING AND ADJUSTMENT OF INSULIN PUMP: ICD-10-CM

## 2024-07-23 DIAGNOSIS — E55.9 VITAMIN D DEFICIENCY, UNSPECIFIED: ICD-10-CM

## 2024-07-23 DIAGNOSIS — E10.649 TYPE 1 DIABETES MELLITUS WITH HYPOGLYCEMIA W/OUT COMA: ICD-10-CM

## 2024-07-23 DIAGNOSIS — E01.0 IODINE-DEFICIENCY RELATED DIFFUSE (ENDEMIC) GOITER: ICD-10-CM

## 2024-07-23 DIAGNOSIS — Z96.41 PRESENCE OF INSULIN PUMP (EXTERNAL) (INTERNAL): ICD-10-CM

## 2024-07-23 LAB — GLUCOSE BLDC GLUCOMTR-MCNC: 314

## 2024-07-23 PROCEDURE — 99215 OFFICE O/P EST HI 40 MIN: CPT

## 2024-07-23 PROCEDURE — 95251 CONT GLUC MNTR ANALYSIS I&R: CPT

## 2024-07-23 PROCEDURE — 36415 COLL VENOUS BLD VENIPUNCTURE: CPT

## 2024-07-23 PROCEDURE — 82962 GLUCOSE BLOOD TEST: CPT

## 2024-07-23 NOTE — HISTORY OF PRESENT ILLNESS
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

## 2024-07-23 NOTE — ASSESSMENT
[Long Term Vascular Complications] : long term vascular complications of diabetes [Action and use of Insulin] : action and use of short and long-acting insulin [Self Monitoring of Blood Glucose] : self monitoring of blood glucose [FreeTextEntry1] : 28M, thin w/ T1DM w/ hypers/hypos here for follow up. Thyromegaly on exam rtc in 3months with np rtc in 6months with me due for labs commended   T1DM- on omnipod/G6. overall doing okay. should get more for carbs bc still having some postprandial highs.  up to date with eye doctor Educated on diet. discussed complications of diabetes at length including MI/CVA/ESRD/dialysis/blindness/amputations/infections backups: toujeo and fiasp (needs to make sure he has some at home in case) has gvoke no changes with settings needs to rotate more (wanted to change ICR by 1 but patient wants to wait. if rotates, and improves, then okay to leave the same)  Thyromegaly- check non urgent sono  ------ With regards to Dexcom:  Dx: E10.65 This patient with diabetes performs 4 glucose checks per day for at least the last 60 days utilizing a home blood glucose monitor. The patient is treated with insulin via 4 injections daily. This patient uses the CGM and glucose monitor readings to frequently adjust their insulin treatment based on set doses adjusted as needed by the physician. In addition, the patient has been to our office for an evaluation of their diabetes control within the past 6 months.

## 2024-07-23 NOTE — PHYSICAL EXAM
[Alert] : alert [Well Nourished] : well nourished [No Acute Distress] : no acute distress [Well Developed] : well developed [Normal Sclera/Conjunctiva] : normal sclera/conjunctiva [EOMI] : extra ocular movement intact [No Proptosis] : no proptosis [Normal Oropharynx] : the oropharynx was normal [Thyroid Not Enlarged] : the thyroid was not enlarged [No Thyroid Nodules] : no palpable thyroid nodules [No Respiratory Distress] : no respiratory distress [No Accessory Muscle Use] : no accessory muscle use [Clear to Auscultation] : lungs were clear to auscultation bilaterally [Normal S1, S2] : normal S1 and S2 [Normal Rate] : heart rate was normal [Regular Rhythm] : with a regular rhythm [Normal Bowel Sounds] : normal bowel sounds [Not Tender] : non-tender [Not Distended] : not distended [Soft] : abdomen soft [No Stigmata of Cushings Syndrome] : no stigmata of Cushings Syndrome [Normal Gait] : normal gait [Normal Strength/Tone] : muscle strength and tone were normal [No Rash] : no rash [Normal Reflexes] : deep tendon reflexes were 2+ and symmetric [No Tremors] : no tremors [Oriented x3] : oriented to person, place, and time [Normal Affect] : the affect was normal [Normal Mood] : the mood was normal [Acanthosis Nigricans] : no acanthosis nigricans [de-identified] : goiter

## 2024-10-24 LAB
HBA1C MFR BLD HPLC: 7.5
LDLC SERPL DIRECT ASSAY-MCNC: 121
MICROALBUMIN/CREAT 24H UR-RTO: 2
TSH SERPL-ACNC: 0.64

## 2024-10-25 ENCOUNTER — RESULT CHARGE (OUTPATIENT)
Age: 29
End: 2024-10-25

## 2024-10-25 ENCOUNTER — APPOINTMENT (OUTPATIENT)
Dept: ENDOCRINOLOGY | Facility: CLINIC | Age: 29
End: 2024-10-25
Payer: COMMERCIAL

## 2024-10-25 VITALS
DIASTOLIC BLOOD PRESSURE: 82 MMHG | HEART RATE: 75 BPM | OXYGEN SATURATION: 98 % | WEIGHT: 136 LBS | HEIGHT: 68 IN | BODY MASS INDEX: 20.61 KG/M2 | SYSTOLIC BLOOD PRESSURE: 120 MMHG

## 2024-10-25 DIAGNOSIS — Z96.41 PRESENCE OF INSULIN PUMP (EXTERNAL) (INTERNAL): ICD-10-CM

## 2024-10-25 DIAGNOSIS — Z46.81 ENCOUNTER FOR FITTING AND ADJUSTMENT OF INSULIN PUMP: ICD-10-CM

## 2024-10-25 DIAGNOSIS — E10.65 TYPE 1 DIABETES MELLITUS WITH HYPERGLYCEMIA: ICD-10-CM

## 2024-10-25 DIAGNOSIS — E78.5 HYPERLIPIDEMIA, UNSPECIFIED: ICD-10-CM

## 2024-10-25 DIAGNOSIS — E55.9 VITAMIN D DEFICIENCY, UNSPECIFIED: ICD-10-CM

## 2024-10-25 DIAGNOSIS — E10.649 TYPE 1 DIABETES MELLITUS WITH HYPOGLYCEMIA W/OUT COMA: ICD-10-CM

## 2024-10-25 DIAGNOSIS — E01.0 IODINE-DEFICIENCY RELATED DIFFUSE (ENDEMIC) GOITER: ICD-10-CM

## 2024-10-25 LAB — GLUCOSE BLDC GLUCOMTR-MCNC: 117

## 2024-10-25 PROCEDURE — G2211 COMPLEX E/M VISIT ADD ON: CPT | Mod: NC

## 2024-10-25 PROCEDURE — 95251 CONT GLUC MNTR ANALYSIS I&R: CPT

## 2024-10-25 PROCEDURE — 99214 OFFICE O/P EST MOD 30 MIN: CPT

## 2024-10-25 PROCEDURE — 82962 GLUCOSE BLOOD TEST: CPT

## 2025-02-01 ENCOUNTER — OFFICE (OUTPATIENT)
Dept: URBAN - METROPOLITAN AREA CLINIC 110 | Facility: CLINIC | Age: 30
Setting detail: OPHTHALMOLOGY
End: 2025-02-01
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
## 2025-02-01 PROCEDURE — NO SHOW FE NO SHOW FEE: Performed by: INTERNAL MEDICINE

## 2025-02-10 ENCOUNTER — APPOINTMENT (OUTPATIENT)
Dept: ENDOCRINOLOGY | Facility: CLINIC | Age: 30
End: 2025-02-10
Payer: COMMERCIAL

## 2025-02-10 VITALS
HEIGHT: 68 IN | HEART RATE: 103 BPM | SYSTOLIC BLOOD PRESSURE: 116 MMHG | BODY MASS INDEX: 20 KG/M2 | WEIGHT: 132 LBS | OXYGEN SATURATION: 97 % | DIASTOLIC BLOOD PRESSURE: 60 MMHG

## 2025-02-10 DIAGNOSIS — E01.0 IODINE-DEFICIENCY RELATED DIFFUSE (ENDEMIC) GOITER: ICD-10-CM

## 2025-02-10 DIAGNOSIS — E55.9 VITAMIN D DEFICIENCY, UNSPECIFIED: ICD-10-CM

## 2025-02-10 DIAGNOSIS — E10.65 TYPE 1 DIABETES MELLITUS WITH HYPERGLYCEMIA: ICD-10-CM

## 2025-02-10 DIAGNOSIS — E78.5 HYPERLIPIDEMIA, UNSPECIFIED: ICD-10-CM

## 2025-02-10 DIAGNOSIS — E10.649 TYPE 1 DIABETES MELLITUS WITH HYPOGLYCEMIA W/OUT COMA: ICD-10-CM

## 2025-02-10 DIAGNOSIS — Z96.41 PRESENCE OF INSULIN PUMP (EXTERNAL) (INTERNAL): ICD-10-CM

## 2025-02-10 DIAGNOSIS — Z46.81 ENCOUNTER FOR FITTING AND ADJUSTMENT OF INSULIN PUMP: ICD-10-CM

## 2025-02-10 LAB — GLUCOSE BLDC GLUCOMTR-MCNC: 165

## 2025-02-10 PROCEDURE — 82962 GLUCOSE BLOOD TEST: CPT

## 2025-02-10 PROCEDURE — 95251 CONT GLUC MNTR ANALYSIS I&R: CPT

## 2025-02-10 PROCEDURE — 99215 OFFICE O/P EST HI 40 MIN: CPT

## 2025-02-11 ENCOUNTER — OUTPATIENT (OUTPATIENT)
Dept: OUTPATIENT SERVICES | Facility: HOSPITAL | Age: 30
LOS: 1 days | End: 2025-02-11
Payer: COMMERCIAL

## 2025-02-11 ENCOUNTER — APPOINTMENT (OUTPATIENT)
Dept: ULTRASOUND IMAGING | Facility: CLINIC | Age: 30
End: 2025-02-11
Payer: COMMERCIAL

## 2025-02-11 DIAGNOSIS — E01.0 IODINE-DEFICIENCY RELATED DIFFUSE (ENDEMIC) GOITER: ICD-10-CM

## 2025-02-11 PROCEDURE — 76536 US EXAM OF HEAD AND NECK: CPT | Mod: 26

## 2025-02-11 PROCEDURE — 76536 US EXAM OF HEAD AND NECK: CPT

## 2025-05-15 LAB
HBA1C MFR BLD HPLC: 8
LDLC SERPL DIRECT ASSAY-MCNC: 144
MICROALBUMIN/CREAT 24H UR-RTO: 2
TSH SERPL-ACNC: 0.73

## 2025-05-16 ENCOUNTER — APPOINTMENT (OUTPATIENT)
Dept: ENDOCRINOLOGY | Facility: CLINIC | Age: 30
End: 2025-05-16
Payer: COMMERCIAL

## 2025-05-16 ENCOUNTER — NON-APPOINTMENT (OUTPATIENT)
Age: 30
End: 2025-05-16

## 2025-05-16 VITALS
HEIGHT: 68 IN | BODY MASS INDEX: 20.61 KG/M2 | WEIGHT: 136 LBS | SYSTOLIC BLOOD PRESSURE: 110 MMHG | DIASTOLIC BLOOD PRESSURE: 72 MMHG | HEART RATE: 81 BPM | OXYGEN SATURATION: 98 %

## 2025-05-16 DIAGNOSIS — Z96.41 PRESENCE OF INSULIN PUMP (EXTERNAL) (INTERNAL): ICD-10-CM

## 2025-05-16 DIAGNOSIS — E10.65 TYPE 1 DIABETES MELLITUS WITH HYPERGLYCEMIA: ICD-10-CM

## 2025-05-16 DIAGNOSIS — E78.5 HYPERLIPIDEMIA, UNSPECIFIED: ICD-10-CM

## 2025-05-16 DIAGNOSIS — E01.0 IODINE-DEFICIENCY RELATED DIFFUSE (ENDEMIC) GOITER: ICD-10-CM

## 2025-05-16 DIAGNOSIS — Z46.81 ENCOUNTER FOR FITTING AND ADJUSTMENT OF INSULIN PUMP: ICD-10-CM

## 2025-05-16 DIAGNOSIS — E55.9 VITAMIN D DEFICIENCY, UNSPECIFIED: ICD-10-CM

## 2025-05-16 DIAGNOSIS — E10.649 TYPE 1 DIABETES MELLITUS WITH HYPOGLYCEMIA W/OUT COMA: ICD-10-CM

## 2025-05-16 LAB — GLUCOSE BLDC GLUCOMTR-MCNC: 137

## 2025-05-16 PROCEDURE — 95251 CONT GLUC MNTR ANALYSIS I&R: CPT

## 2025-05-16 PROCEDURE — 82962 GLUCOSE BLOOD TEST: CPT

## 2025-05-16 PROCEDURE — 99214 OFFICE O/P EST MOD 30 MIN: CPT

## 2025-05-16 PROCEDURE — G2211 COMPLEX E/M VISIT ADD ON: CPT | Mod: NC

## 2025-07-10 ENCOUNTER — APPOINTMENT (OUTPATIENT)
Dept: INFECTIOUS DISEASE | Facility: CLINIC | Age: 30
End: 2025-07-10
Payer: COMMERCIAL

## 2025-07-10 VITALS
HEART RATE: 87 BPM | SYSTOLIC BLOOD PRESSURE: 110 MMHG | DIASTOLIC BLOOD PRESSURE: 70 MMHG | HEIGHT: 68 IN | WEIGHT: 131 LBS | OXYGEN SATURATION: 96 % | BODY MASS INDEX: 19.85 KG/M2 | TEMPERATURE: 98.1 F

## 2025-07-10 PROCEDURE — 36415 COLL VENOUS BLD VENIPUNCTURE: CPT

## 2025-07-10 PROCEDURE — 99205 OFFICE O/P NEW HI 60 MIN: CPT

## 2025-07-10 RX ORDER — METRONIDAZOLE 500 MG/1
500 TABLET ORAL TWICE DAILY
Qty: 14 | Refills: 0 | Status: ACTIVE | COMMUNITY
Start: 2025-07-10 | End: 1900-01-01

## 2025-07-11 LAB
ALBUMIN SERPL ELPH-MCNC: 5 G/DL
ALP BLD-CCNC: 85 U/L
ALT SERPL-CCNC: 16 U/L
ANION GAP SERPL CALC-SCNC: 13 MMOL/L
AST SERPL-CCNC: 21 U/L
BASOPHILS # BLD AUTO: 0.05 K/UL
BASOPHILS NFR BLD AUTO: 0.6 %
BILIRUB SERPL-MCNC: 0.5 MG/DL
BUN SERPL-MCNC: 14 MG/DL
C TRACH RRNA SPEC QL NAA+PROBE: NOT DETECTED
CALCIUM SERPL-MCNC: 9.8 MG/DL
CHLORIDE SERPL-SCNC: 102 MMOL/L
CO2 SERPL-SCNC: 25 MMOL/L
CREAT SERPL-MCNC: 0.73 MG/DL
EGFRCR SERPLBLD CKD-EPI 2021: 128 ML/MIN/1.73M2
EOSINOPHIL # BLD AUTO: 0.45 K/UL
EOSINOPHIL NFR BLD AUTO: 5.7 %
HBV SURFACE AB SERPL IA-ACNC: 4 MIU/ML
HCT VFR BLD CALC: 48.7 %
HCV AB SER QL: NONREACTIVE
HCV S/CO RATIO: 0.11 S/CO
HGB BLD-MCNC: 14.8 G/DL
HIV1+2 AB SPEC QL IA.RAPID: NONREACTIVE
IMM GRANULOCYTES NFR BLD AUTO: 0.3 %
LYMPHOCYTES # BLD AUTO: 2.19 K/UL
LYMPHOCYTES NFR BLD AUTO: 27.8 %
MAN DIFF?: NORMAL
MCHC RBC-ENTMCNC: 26 PG
MCHC RBC-ENTMCNC: 30.4 G/DL
MCV RBC AUTO: 85.6 FL
MONOCYTES # BLD AUTO: 0.57 K/UL
MONOCYTES NFR BLD AUTO: 7.2 %
N GONORRHOEA RRNA SPEC QL NAA+PROBE: NOT DETECTED
NEUTROPHILS # BLD AUTO: 4.59 K/UL
NEUTROPHILS NFR BLD AUTO: 58.4 %
PLATELET # BLD AUTO: 273 K/UL
POTASSIUM SERPL-SCNC: 4.6 MMOL/L
PROT SERPL-MCNC: 7.7 G/DL
RBC # BLD: 5.69 M/UL
RBC # FLD: 13.5 %
SODIUM SERPL-SCNC: 140 MMOL/L
SOURCE AMPLIFICATION: NORMAL
T PALLIDUM AB SER QL IA: NEGATIVE
WBC # FLD AUTO: 7.87 K/UL